# Patient Record
Sex: MALE | Race: WHITE
[De-identification: names, ages, dates, MRNs, and addresses within clinical notes are randomized per-mention and may not be internally consistent; named-entity substitution may affect disease eponyms.]

---

## 2019-04-19 ENCOUNTER — HOSPITAL ENCOUNTER (OUTPATIENT)
Dept: HOSPITAL 96 - M.LAB | Age: 59
End: 2019-04-19
Attending: NURSE PRACTITIONER
Payer: OTHER GOVERNMENT

## 2019-04-19 DIAGNOSIS — E78.00: Primary | ICD-10-CM

## 2019-04-19 DIAGNOSIS — R07.89: ICD-10-CM

## 2019-04-19 LAB
CHOLEST SERPL-MCNC: 192 MG/DL (ref ?–200)
HDLC SERPL-MCNC: 37 MG/DL (ref 40–?)
LDLC SERPL-MCNC: 127 MG/DL (ref ?–100)
NT-PRO BRAIN NAT PEPTIDE: 6 PG/ML (ref ?–300)
TC:HDL: 5.2 RATIO
TRIGL SERPL-MCNC: 140 MG/DL (ref ?–150)
VLDLC SERPL CALC-MCNC: 28 MG/DL (ref ?–40)

## 2019-11-21 ENCOUNTER — HOSPITAL ENCOUNTER (OUTPATIENT)
Dept: HOSPITAL 96 - M.ULTRA | Age: 59
End: 2019-11-21
Attending: NURSE PRACTITIONER
Payer: OTHER GOVERNMENT

## 2019-11-21 DIAGNOSIS — K76.0: Primary | ICD-10-CM

## 2019-11-21 DIAGNOSIS — R16.0: ICD-10-CM

## 2019-12-12 ENCOUNTER — HOSPITAL ENCOUNTER (EMERGENCY)
Dept: HOSPITAL 96 - M.ERS | Age: 59
Discharge: HOME | End: 2019-12-12
Payer: OTHER GOVERNMENT

## 2019-12-12 VITALS — SYSTOLIC BLOOD PRESSURE: 112 MMHG | DIASTOLIC BLOOD PRESSURE: 61 MMHG

## 2019-12-12 VITALS — HEIGHT: 70 IN | WEIGHT: 295 LBS | BODY MASS INDEX: 42.23 KG/M2

## 2019-12-12 DIAGNOSIS — R07.89: Primary | ICD-10-CM

## 2019-12-12 DIAGNOSIS — Z88.1: ICD-10-CM

## 2019-12-12 LAB
ABSOLUTE BASOPHILS: 0.1 THOU/UL (ref 0–0.2)
ABSOLUTE EOSINOPHILS: 0.3 THOU/UL (ref 0–0.7)
ABSOLUTE MONOCYTES: 0.8 THOU/UL (ref 0–1.2)
ALBUMIN SERPL-MCNC: 3.4 G/DL (ref 3.4–5)
ALP SERPL-CCNC: 58 U/L (ref 46–116)
ALT SERPL-CCNC: 51 U/L (ref 30–65)
ANION GAP SERPL CALC-SCNC: 10 MMOL/L (ref 7–16)
AST SERPL-CCNC: 29 U/L (ref 15–37)
BASOPHILS NFR BLD AUTO: 0.7 %
BILIRUB SERPL-MCNC: 0.5 MG/DL
BUN SERPL-MCNC: 14 MG/DL (ref 7–18)
CALCIUM SERPL-MCNC: 9.4 MG/DL (ref 8.5–10.1)
CHLORIDE SERPL-SCNC: 104 MMOL/L (ref 98–107)
CO2 SERPL-SCNC: 26 MMOL/L (ref 21–32)
CREAT SERPL-MCNC: 1 MG/DL (ref 0.6–1.3)
EOSINOPHIL NFR BLD: 3.1 %
GLUCOSE SERPL-MCNC: 125 MG/DL (ref 70–99)
GRANULOCYTES NFR BLD MANUAL: 48.2 %
HCT VFR BLD CALC: 47.2 % (ref 42–52)
HGB BLD-MCNC: 16.2 GM/DL (ref 14–18)
INR PPP: 1.1
LIPASE: 110 U/L (ref 73–393)
LYMPHOCYTES # BLD: 3.1 THOU/UL (ref 0.8–5.3)
LYMPHOCYTES NFR BLD AUTO: 38 %
MCH RBC QN AUTO: 30.5 PG (ref 26–34)
MCHC RBC AUTO-ENTMCNC: 34.4 G/DL (ref 28–37)
MCV RBC: 88.7 FL (ref 80–100)
MONOCYTES NFR BLD: 10 %
MPV: 9.6 FL. (ref 7.2–11.1)
NEUTROPHILS # BLD: 3.9 THOU/UL (ref 1.6–8.1)
NT-PRO BRAIN NAT PEPTIDE: 11 PG/ML (ref ?–300)
NUCLEATED RBCS: 0 /100WBC
PLATELET COUNT*: 197 THOU/UL (ref 150–400)
POTASSIUM SERPL-SCNC: 3.7 MMOL/L (ref 3.5–5.1)
PROT SERPL-MCNC: 6.9 G/DL (ref 6.4–8.2)
PROTHROMBIN TIME: 11 SECONDS (ref 9.2–11.5)
RBC # BLD AUTO: 5.32 MIL/UL (ref 4.5–6)
RDW-CV: 14.5 % (ref 10.5–14.5)
SODIUM SERPL-SCNC: 140 MMOL/L (ref 136–145)
WBC # BLD AUTO: 8.2 THOU/UL (ref 4–11)

## 2019-12-13 NOTE — EKG
[place] [address]
Phone:  [phone]                     ELECTROCARDIOGRAM REPORT      
_______________________________________________________________________________
 
Name:       BALA WOODS            [id number]      Account #:      [id number]  
Admission:  19     Attend Phys:                         
Discharge:  19     Date of Birth:  [birth year]  
         Report #: [id number]
    [id number]
_______________________________________________________________________________
THIS REPORT FOR:  //name//                      
 
                         [facility] ED
                                       
Test Date:    2019               Test Time:    16:27:31
Pat Name:     BALA WOODS            Department:   
Patient ID:   [id number]            Room:          
Gender:       M                        Technician:   
:          1960               Requested By: Gildardo Mansfield
Order Number: [id number] MD:   Zak Delcid
                                 Measurements
Intervals                              Axis          
Rate:         60                       P:            40
DC:           161                      QRS:          16
QRSD:         98                       T:            17
QT:           421                                    
QTc:          421                                    
                           Interpretive Statements
Sinus rhythm
Abnormal R-wave progression, early transition
No previous ECG available for comparison
 
Electronically Signed On 2019 10:28:08 CST by Zak Delcid
[email or web address]
 
 
 
 
 
 
 
 
 
 
 
 
 
 
 
 
 
 
 
  <ELECTRONICALLY SIGNED>
                                           By: Zak Delcid MD, [facility]      
  19     1028
D: 19 1627   _____________________________________
T: 19 1627   Zak Delcid MD, FACC        /EPI